# Patient Record
(demographics unavailable — no encounter records)

---

## 2022-02-13 NOTE — ED LOWER EXTREMITY
General


Stated Complaint:  L ANKLE PAIN / INJ


Source:  patient, family (mom)


Exam Limitations:  no limitations





History of Present Illness


Date Seen by Provider:  Feb 13, 2022


Time Seen by Provider:  23:13


Initial Comments


Patient is a 30-year-old male who presents to the emergency department today 

with a chief complaint of left ankle pain.  Patient's been drinking a 

significant amount of alcohol this evening watching the Super Bowl.  He states 

that he was walking down some stairs when he slipped and fell.  He was able to 

stand up and walk on the ankle however it felt very unstable and he had 

increased pain.  He denies any injuries to any other parts of his body.  He 

states he did not hit his head, did not get knocked out.  No neck chest abdomen 

pelvis or back pain.





He has had previous hardware placed in his right femur.





All other review of systems reviewed and negative except as stated


Onset:  just prior to arrival


Pain/Injury Location:  left ankle


Method of Injury:  fell


Modifying Factors:  Improves With Immobilization; Worse With Movement





Allergies and Home Medications


Allergies


Coded Allergies:  


     No Known Drug Allergies (Unverified , 2/13/22)





Patient Home Medication List


Home Medication List Reviewed:  Yes





Review of Systems


Constitutional:  see HPI


EENTM:  no symptoms reported


Respiratory:  no symptoms reported


Cardiovascular:  no symptoms reported


Gastrointestinal:  no symptoms reported


Genitourinary:  no symptoms reported


Musculoskeletal:  joint pain (left ankle (lateral))


Skin:  no symptoms reported


Psychiatric/Neurological:  No Symptoms Reported





All Other Systems Reviewed


Negative Unless Noted:  Yes





Past Medical-Social-Family Hx


Past Medical History


Orthopedic


Respiratory:  No


Cardiac:  No


Neurological:  No


Genitourinary:  No


Gastrointestinal:  No


Musculoskeletal:  No


Endocrine:  No





Physical Exam


Vital Signs





Vital Signs - First Documented








 2/13/22





 23:15


 


Temp 37.1


 


Pulse 96


 


Resp 16


 


B/P (MAP) 139/102 (114)


 


Pulse Ox 99


 


O2 Delivery Room Air





Capillary Refill :


Height, Weight, BMI


Height: '"


Weight: lbs. oz. kg; 23.00 BMI


Method:


General Appearance:  WD/WN, no apparent distress


HEENT:  PERRL/EOMI


Neck:  non-tender, full range of motion


Cardiovascular:  regular rate, rhythm (tachy )


Respiratory:  lungs clear, normal breath sounds, no respiratory distress, no 

accessory muscle use


Gastrointestinal:  normal bowel sounds, non tender, soft


Hips:  bilateral hip non-tender, bilateral hip normal inspection, bilateral hip 

normal range of motion, bilateral hip no evidence of injury


Legs:  bilateral leg non-tender, bilateral leg normal inspection, bilateral leg 

normal range of motion, bilateral leg no evidence of injury


Knees:  bilateral knee non-tender, bilateral knee normal inspection, bilateral 

knee normal range of motion, bilateral knee no evidence of injury


Ankles:  left ankle limited range of motion, left ankle pain, left ankle soft 

tissue tenderness (lateral malleolus), left ankle swelling (lateral malleolus)


Feet:  bilateral foot non-tender, bilateral foot normal inspection, bilateral 

foot normal range of motion, bilateral foot no evidence of injury


Neurologic/Tendon:  normal sensation, normal motor functions


Neurologic/Psychiatric:  alert, normal mood/affect, oriented x 3, other (alcohol

intoxication)


Skin:  normal color, warm/dry





Procedures/Interventions


Splinting and Joint Reduction :  


   Pre-Proc Neuro Vasc Exam:  normal


   Post-Proc Neuro Vasc Exam:  normal


   Ordered:  Crutches


   Hand-Made Type:  orthoglass


   Splint Application:  Short Leg





Progress/Results/Core Measures


Results/Orders


My Orders





Orders - MARI BYRD MD


Ankle, Left, 3 Views (2/13/22 23:22)


Ibuprofen  Tablet (Motrin  Tablet) (2/13/22 23:45)





Vital Signs/I&O











 2/13/22





 23:15


 


Temp 37.1


 


Pulse 96


 


Resp 16


 


B/P (MAP) 139/102 (114)


 


Pulse Ox 99


 


O2 Delivery Room Air











Progress


Progress Note :  


   Time:  23:42


Progress Note


Patient placed in a short leg posterior OCL splint.  Neurovascularly intact.  

Given crutches.  Toe-touch weightbearing as tolerated.  Follow-up with Dr. Paz.  Ibuprofen 600 mg every 6-8 hours with food for pain.  He can have Tylen

ol in 24 hours after his alcohol is worn off.  Recommend ice, elevation.





Diagnostic Imaging





   Diagonstic Imaging:  Xray


Comments


Nondisplaced distal fibula fracture -interpreted by me





Departure


Impression





   Primary Impression:  


   Closed fracture of distal end of fibula


   Qualified Codes:  S82.832A - Other fracture of upper and lower end of left 

   fibula, initial encounter for closed fracture


Disposition:  01 HOME, SELF-CARE


Condition:  Stable





Departure-Patient Inst.


Decision time for Depature:  23:44


Referrals:  


NO,LOCAL PHYSICIAN (PCP)


Primary Care Physician








KARISSA PAZ MD


Patient Instructions:  Fibula Fracture





Add. Discharge Instructions:  


Keep the splint in place until you follow-up with the bone doctor, Dr. Paz.





When you are off your feet, elevate your left leg, keep an ice pack on it for 

the next couple of days.





You can walk with crutches and toe-touch with your left foot to keep your 

balance.  Do not put full weight on your left leg.





Over-the-counter ibuprofen 3 tablets which is 600 mg every 6 hours for pain.  

Take ibuprofen with food.





In 24 hours you can alternate with Tylenol.  Do not drink alcohol and take 

Tylenol at the same time.





Come back to the emergency room for any new, concerning or emergent complaints.





Copy


Copies To 1:   KARISSA PAZ MD, KATHRYN M MD         Feb 13, 2022 23:24

## 2022-02-14 NOTE — DIAGNOSTIC IMAGING REPORT
EXAM: ANKLE, LEFT, 3 VIEWS



INDICATION: Trauma. Left ankle pain.



COMPARISON: None.



FINDINGS: Mildly displaced and comminuted fracture of the distal

left fibular metaphysis at the level of the tibial plafond. There

is also a linear lucency through the posterior left distal tibial

cortex which could represent a posterior malleolar fracture. No

radiopaque foreign bodies. No other fractures identified.



IMPRESSION: 

1. Mildly displaced and comminuted fracture of the distal left

fibular metaphysis at the level of the tibial plafond.

2. Linear lucency seen only on the lateral views the posterior

cortex of the left tibia could represent additional fracture.

This could be better evaluated with CT.



Dictated by: 



  Dictated on workstation # [id number]

## 2022-02-16 NOTE — ED LOWER EXTREMITY
General


Chief Complaint:  Lower Extremity


Stated Complaint:  L ANKLE PAIN SEEN 02/13


Source:  patient


Exam Limitations:  no limitations





History of Present Illness


Date Seen by Provider:  Feb 16, 2022


Time Seen by Provider:  14:52


Initial Comments


30-year-old male with no significant past medical history seen here 3 days ago 

and had a left distal fibula fracture placed in a posterior slab splint.  He was

taking ibuprofen and Tylenol for pain but the pain is too much and he has been 

calling the orthopedic office to try to get an appointment, but they say the 

phone number just keeps ringing.  Came in because he needed better pain control.

 Has not been walking on it, and denies pain in any new places.





Allergies and Home Medications


Allergies


Coded Allergies:  


     No Known Drug Allergies (Unverified , 2/13/22)





Patient Home Medication List


Home Medication List Reviewed:  Yes


Hydrocodone Bit/Acetaminophen (HYDROcodone/APAP  5 MG/325 MG TAB) 1 Tab Tab, 1 

TAB PO Q6H PRN for PAIN-SEVERE (8-10)


   Prescribed by: KELSEY DURAN on 2/16/22 1510





Review of Systems


Constitutional:  No chills, No fever


EENTM:  No blurred vision


Respiratory:  No cough


Cardiovascular:  No chest pain


Gastrointestinal:  no symptoms reported


Genitourinary:  no symptoms reported


Musculoskeletal:  joint pain


Skin:  no symptoms reported


Psychiatric/Neurological:  No Symptoms Reported





All Other Systems Reviewed


Negative Unless Noted:  Yes





Past Medical-Social-Family Hx


Patient Social History


Tobacco Use?:  Yes


Alcohol Use?:  Yes





Immunizations Up To Date


First/Initial COVID19 Vaccinat:  Nov 2021


Second COVID19 Vaccination [place]:  Dec 2021





Past Medical History


Surgeries:  No


Orthopedic


Respiratory:  No


Cardiac:  No


Neurological:  No


Genitourinary:  No


Gastrointestinal:  No


Musculoskeletal:  No


Endocrine:  No





Physical Exam


Vital Signs





Vital Signs - First Documented








 2/16/22





 14:55


 


Temp 36.3


 


Pulse 97


 


Resp 18


 


B/P (MAP) 147/90 (109)


 


Pulse Ox 97


 


O2 Delivery Room Air





Capillary Refill :


Height, Weight, BMI


Height: '"


Weight: lbs. oz. kg; 22.00 BMI


Method:


General Appearance:  WD/WN, no apparent distress


HEENT:  PERRL/EOMI, normal ENT inspection, pharynx normal


Neck:  non-tender, full range of motion, supple, normal inspection


Cardiovascular:  regular rate, rhythm, no edema, no murmur


Respiratory:  chest non-tender, lungs clear, normal breath sounds, no 

respiratory distress, no accessory muscle use


Gastrointestinal:  normal bowel sounds, non tender, soft; No distended, No 

guarding, No rebound


Hips:  bilateral hip non-tender, bilateral hip normal inspection, bilateral hip 

normal range of motion, bilateral hip no evidence of injury


Legs:  bilateral leg non-tender, bilateral leg normal inspection, bilateral leg 

normal range of motion, bilateral leg no evidence of injury


Knees:  bilateral knee non-tender, bilateral knee normal inspection, bilateral 

knee normal range of motion, bilateral knee no evidence of injury, bilateral 

knee other (He is not tender along the proximal fibula)


Ankles:  right ankle non-tender, right ankle normal inspection, right ankle 

normal range of motion, right ankle no evidence of injury; left ankle other (In 

a splint, toes are normal-appearing with normal sensation and capillary refill, 

I ended the splint and the compartments are soft around the ankle, does have 

tenderness along the lateral malleolus with some swelling)


Feet:  bilateral foot other (No tenderness along the Lisfranc or fifth 

metatarsal)


Neurologic/Tendon:  normal sensation, normal motor functions, normal tendon 

functions


Neurologic/Psychiatric:  no motor/sensory deficits, alert, normal mood/affect


Skin:  normal color, warm/dry


Lymphatic:  no adenopathy





Progress/Results/Core Measures


Results/Orders


Vital Signs/I&O











 2/16/22





 14:55


 


Temp 36.3


 


Pulse 97


 


Resp 18


 


B/P (MAP) 147/90 (109)


 


Pulse Ox 97


 


O2 Delivery Room Air











Progress


Progress Note :  


Progress Note


30-year-old male with above history coming in due to left ankle pain.  ABCs 

intact and vitals are stable on presentation.  I reviewed his x-ray from 3 days 

ago and he does not fact have a distal left fibula fracture which is minimally 

displaced.  I undid the dressing on his splint just to check his skin and everyt

eh is intact and there is no signs of compartment syndrome.  Neurovascularly 

intact as well.  I then replaced the splint with a new Ace bandage.  He has not 

had any new events, so I will not get a repeat x-ray at this time.  I give him 

multiple orthopedic offices that he can try to schedule appointment with.  He 

was then discharged home in stable condition with strict return precautions





Departure


Impression





   Primary Impression:  


   Closed fracture of distal end of fibula


   Qualified Codes:  S82.832D - Other fracture of upper and lower end of left 

   fibula, subsequent encounter for closed fracture with routine healing


Disposition:  01 HOME, SELF-CARE


Condition:  Stable





Departure-Patient Inst.


Decision time for Depature:  15:07


Referrals:  


NO,LOCAL PHYSICIAN (PCP)


Primary Care Physician








SCHWAB,TERRY D MD ZAFUTA,MICHAEL P MD


Patient Instructions:  Ankle Fracture (DC)





Add. Discharge Instructions:  


You can follow-up with any orthopedic doctor that you would like such as Dr. Reid, Dr. Schwab, or with [facility] [place] which the numbers 

below.  Take ibuprofen 600 to 800 mg every 6 hours on a schedule.  Also ice the 

area but try not to get it wet.  If you are having severe pain on top of this 

you can take the hydrocodone.





[facility] [place] - [phone]


Scripts


Hydrocodone Bit/Acetaminophen (HYDROcodone/APAP  5 MG/325 MG TAB) 1 Tab Tab


1 TAB PO Q6H PRN for PAIN-SEVERE (8-10) for 3 Days, #12 TAB 0 Refills


   Prov: KELSEY DURAN MD         2/16/22


Work/School Note:  Work Release Form   Date Seen in the Emergency Department:  

Feb 16, 2022


   Return to Work:  Feb 17, 2022


   Restrictions:  Need Release from Doctor


      Restrictions:  Needs ortho follow up before any physical labor











KELSEY DURAN MD          Feb 16, 2022 15:09